# Patient Record
(demographics unavailable — no encounter records)

---

## 2025-06-03 NOTE — DISCUSSION/SUMMARY
[FreeTextEntry1] : 49 y/o  LMP 3+yrs ago presents for annual exam.    #Well Woman Visit 1. Nutrition/Activity: The benefits of balanced diet and physical activity discussed with patient.  2. Health Screening: She was informed of the benefits of a screening colonoscopy/DEXA/Mammo/Pap. - Cervix: We reviewed ASCCP/ACOG guidelines for pap smear screening. Last PAP 2022, NILM HPV NEG. PAP collected today.  - Breast: last mammo , B1. Has mammo appointment next week already.  3. Sex Health:  The importance of safe-sex practices was discussed with the patient. STD screening was offered to patient, she defers.  4. Contraception: Nexplanon in place, inserted Dec 2022, feels well.   She verbalized understanding and agreement with above counseling regarding differential diagnosis, evaluation, and plan.  She was given time for questions/concerns which were all answered to her apparent satisfaction. All designated lab work for today drawn in office.   RTO 1y for next GYN ANNUAL

## 2025-06-03 NOTE — PHYSICAL EXAM
[FreeTextEntry2] : Madelin GONZALEZ MA [Appropriately responsive] : appropriately responsive [Alert] : alert [No Acute Distress] : no acute distress [Oriented x3] : oriented x3 [Examination Of The Breasts] : a normal appearance [No Masses] : no breast masses were palpable [Labia Majora] : normal [Labia Minora] : normal [Normal] : normal [Uterine Adnexae] : non-palpable

## 2025-06-03 NOTE — HISTORY OF PRESENT ILLNESS
[N] : Patient reports normal menses [Y] : Positive pregnancy history [Menarche Age: ____] : age at menarche was [unfilled] [No] : Patient does not have concerns regarding sex [Mammogramdate] : 03/26/21 [TextBox_19] : BR1 [PapSmeardate] : 11/11/22 [TextBox_31] : NEG [HPVDate] : 11/11/22 [TextBox_78] : NEG [PGHxTotal] : 4 [[facility]] : 3 [[facility] Cliniciving] : 3 [PGHxABSpont] : 1 [FreeTextEntry1] : C/S: 3